# Patient Record
(demographics unavailable — no encounter records)

---

## 2024-11-25 NOTE — PHYSICAL EXAM
[Chaperone Present] : A chaperone was present in the examining room during all aspects of the physical examination [[id number]] : A chaperone was present during the pelvic exam. [Examination Of The Breasts] : a normal appearance [No Masses] : no breast masses were palpable [Labia Majora] : normal [Labia Minora] : normal [Normal] : normal [Uterine Adnexae] : normal [Declined] : Patient declined rectal exam [FreeTextEntry2] : JOSE MARIA Burgess

## 2024-11-25 NOTE — HISTORY OF PRESENT ILLNESS
[N] : Patient denies prior pregnancies [Gonorrhea test offered] : Gonorrhea test offered [Chlamydia test offered] : Chlamydia test offered [Trichomonas test offered] : Trichomonas test offered [HPV test offered] : HPV test offered [Y] : Patient uses contraception [Condoms] : uses condoms [FreeTextEntry1] : PATIENT PRESENT FOR ANNUAL GYN EXAM. [TextBox_4] : Pt present for initial gyn exam; sexually active [Mammogramdate] : 0 [BreastSonogramDate] : 0 [PapSmeardate] : 0 [BoneDensityDate] : 0 [ColonoscopyDate] : 0 [LMPDate] : 11/4/24 [MensesFreq] : 28 [MensesLength] : 5

## 2025-05-08 NOTE — HISTORY OF PRESENT ILLNESS
[No] : never pregnant [TextBox_4] : pt presents recently back from college in [place] with c/o yeast infection with discharge and itch pt also with no menses since 2/14/25 and states she does have irregular cycles  pt is very athletic - works out 2x day  [Mammogramdate] : -0- [BreastSonogramDate] : -0- [PapSmeardate] : 11/25/24 [BoneDensityDate] : -0- [ColonoscopyDate] : -0- [LMPDate] : 2/14/25 [PGHxTotal] : 0 [TextBox_6] : 2/14/25 [FreeTextEntry1] : 2/14/25

## 2025-05-08 NOTE — REVIEW OF SYSTEMS
Form signed in folder to be faxed back to company   [Genital Rash/Irritation] : genital rash/irritation [Negative] : Heme/Lymph

## 2025-05-08 NOTE — PLAN
[FreeTextEntry1] : GYN visit  +VVC  diflucan/terazol/nystatin as ordered  junel 1/20 fe x 3 months  pt instructed on use of all meds/ r/b/a / safe sex practices  Hormone profile  recommend probiotic/vit c/d/daily yogurt/ increase po fluids  yeast prevention measures d/w/p  pt to rto 3 months and as needed

## 2025-05-08 NOTE — PHYSICAL EXAM
[MA] : MA [FreeTextEntry2] : Brandie [Appropriately responsive] : appropriately responsive [Alert] : alert [No Acute Distress] : no acute distress [Oriented x3] : oriented x3 [Labia Majora] : normal [Labia Minora] : normal [Discharge] : a  ~M vaginal discharge was present [Heavy] : heavy [White] : white [Thick] : thick [No Bleeding] : There was no active vaginal bleeding [Normal] : normal [Uterine Adnexae] : normal